# Patient Record
(demographics unavailable — no encounter records)

---

## 2024-10-31 NOTE — ASSESSMENT
[FreeTextEntry1] : Follow up  Dizziness/Hx of vertigo start Meclizine 25mg TID prn - Rx sent to pharmacy. referred to vestibular therapy  Onychomycosis Ciclopirox solution - Rx sent to pharmacy.  Hx of unilateral otalgia Neomycin-Polymyxin ear drops renewed today  Hx of arthritis cont Meloxicam 15mg daily with food  Hx of heart murmur Does not want to follow up with cardiology  HLD cont Simvastatin 40 mg daily -renewed today Reinforced the importance of following a low fat/low cholesterol diet. We'll check Lipid panel and LFTs today.  flu vaccine administered today  Blood work ordered. Follow up in one week for lab results.

## 2024-10-31 NOTE — PHYSICAL EXAM
[No Acute Distress] : no acute distress [Well Nourished] : well nourished [Well Developed] : well developed [Well-Appearing] : well-appearing [Normal Sclera/Conjunctiva] : normal sclera/conjunctiva [Normal Outer Ear/Nose] : the outer ears and nose were normal in appearance [No JVD] : no jugular venous distention [No Lymphadenopathy] : no lymphadenopathy [Supple] : supple [No Respiratory Distress] : no respiratory distress  [No Accessory Muscle Use] : no accessory muscle use [Clear to Auscultation] : lungs were clear to auscultation bilaterally [Normal Rate] : normal rate  [Regular Rhythm] : with a regular rhythm [Normal S1, S2] : normal S1 and S2 [Pedal Pulses Present] : the pedal pulses are present [No Edema] : there was no peripheral edema [No Extremity Clubbing/Cyanosis] : no extremity clubbing/cyanosis [Soft] : abdomen soft [Non Tender] : non-tender [Non-distended] : non-distended [Normal Posterior Cervical Nodes] : no posterior cervical lymphadenopathy [Normal Anterior Cervical Nodes] : no anterior cervical lymphadenopathy [No CVA Tenderness] : no CVA  tenderness [No Spinal Tenderness] : no spinal tenderness [No Rash] : no rash [Coordination Grossly Intact] : coordination grossly intact [No Focal Deficits] : no focal deficits [Normal Gait] : normal gait [Normal Affect] : the affect was normal [Normal Insight/Judgement] : insight and judgment were intact [de-identified] : + II/VI murmur [de-identified] : + trigger finger of thumb of rt hand  [de-identified] : + onychomycosis of left middle toe

## 2024-10-31 NOTE — HISTORY OF PRESENT ILLNESS
[de-identified] : Mr. KELLI BURNS is a 78 year old male with Hx of hypercholesterolemia, heart murmur, arthritis, who presents for a follow up visit and Rx refills.   Pt c/o dizziness. Pt with Hx of vertigo. C/o toe fungus. Denies any SOB, CP, abdominal pain, N/V/D, headache, or leg swelling. Reports compliance with taking his meds daily.

## 2024-10-31 NOTE — ADDENDUM
[FreeTextEntry1] : Documented by Lyndsey Martinez acting as a scribe for Dr. Edilma Boucher. 10/31/2024   All medical record entries made by the scribe were at my, Dr. Edilma Boucher, direction and personally dictated by me on 10/31/2024. I have reviewed the chart and agree that the record accurately reflects my personal performance of the history, physical exam, assessment and plan. I have also personally directed, reviewed, and agreed with the chart.

## 2024-10-31 NOTE — PHYSICAL EXAM
[No Acute Distress] : no acute distress [Well Nourished] : well nourished [Well Developed] : well developed [Well-Appearing] : well-appearing [Normal Sclera/Conjunctiva] : normal sclera/conjunctiva [Normal Outer Ear/Nose] : the outer ears and nose were normal in appearance [No JVD] : no jugular venous distention [No Lymphadenopathy] : no lymphadenopathy [Supple] : supple [No Respiratory Distress] : no respiratory distress  [No Accessory Muscle Use] : no accessory muscle use [Clear to Auscultation] : lungs were clear to auscultation bilaterally [Normal Rate] : normal rate  [Regular Rhythm] : with a regular rhythm [Normal S1, S2] : normal S1 and S2 [Pedal Pulses Present] : the pedal pulses are present [No Edema] : there was no peripheral edema [No Extremity Clubbing/Cyanosis] : no extremity clubbing/cyanosis [Soft] : abdomen soft [Non Tender] : non-tender [Non-distended] : non-distended [Normal Posterior Cervical Nodes] : no posterior cervical lymphadenopathy [Normal Anterior Cervical Nodes] : no anterior cervical lymphadenopathy [No CVA Tenderness] : no CVA  tenderness [No Spinal Tenderness] : no spinal tenderness [No Rash] : no rash [Coordination Grossly Intact] : coordination grossly intact [No Focal Deficits] : no focal deficits [Normal Gait] : normal gait [Normal Affect] : the affect was normal [Normal Insight/Judgement] : insight and judgment were intact [de-identified] : + II/VI murmur [de-identified] : + trigger finger of thumb of rt hand  [de-identified] : + onychomycosis of left middle toe

## 2024-10-31 NOTE — HISTORY OF PRESENT ILLNESS
[de-identified] : Mr. KELLI BURNS is a 78 year old male with Hx of hypercholesterolemia, heart murmur, arthritis, who presents for a follow up visit and Rx refills.   Pt c/o dizziness. Pt with Hx of vertigo. C/o toe fungus. Denies any SOB, CP, abdominal pain, N/V/D, headache, or leg swelling. Reports compliance with taking his meds daily.

## 2025-05-14 NOTE — HISTORY OF PRESENT ILLNESS
[de-identified] : Mr. KELLI BURNS is a 78 year old male with a Hx of hypercholesterolemia, heart murmur, arthritis, who presents for an annual physical.   Pt states he is overall feeling well. Continues to c/o constipation Denies any SOB, CP, abdominal pain, N/V/D, headache, dizziness, or leg swelling. Reports compliance with taking his meds daily.

## 2025-05-14 NOTE — PHYSICAL EXAM
[No Acute Distress] : no acute distress [Well Nourished] : well nourished [Well Developed] : well developed [Well-Appearing] : well-appearing [Normal Sclera/Conjunctiva] : normal sclera/conjunctiva [PERRL] : pupils equal round and reactive to light [EOMI] : extraocular movements intact [Normal Outer Ear/Nose] : the outer ears and nose were normal in appearance [Normal Oropharynx] : the oropharynx was normal [Normal TMs] : both tympanic membranes were normal [No JVD] : no jugular venous distention [No Lymphadenopathy] : no lymphadenopathy [Supple] : supple [Thyroid Normal, No Nodules] : the thyroid was normal and there were no nodules present [No Respiratory Distress] : no respiratory distress  [No Accessory Muscle Use] : no accessory muscle use [Clear to Auscultation] : lungs were clear to auscultation bilaterally [Normal Rate] : normal rate  [Regular Rhythm] : with a regular rhythm [Normal S1, S2] : normal S1 and S2 [Pedal Pulses Present] : the pedal pulses are present [No Edema] : there was no peripheral edema [No Extremity Clubbing/Cyanosis] : no extremity clubbing/cyanosis [Soft] : abdomen soft [Non Tender] : non-tender [Non-distended] : non-distended [No Masses] : no abdominal mass palpated [No HSM] : no HSM [Normal Bowel Sounds] : normal bowel sounds [Normal Posterior Cervical Nodes] : no posterior cervical lymphadenopathy [Normal Anterior Cervical Nodes] : no anterior cervical lymphadenopathy [No CVA Tenderness] : no CVA  tenderness [No Spinal Tenderness] : no spinal tenderness [No Rash] : no rash [Coordination Grossly Intact] : coordination grossly intact [No Focal Deficits] : no focal deficits [Normal Gait] : normal gait [Normal Affect] : the affect was normal [Normal Insight/Judgement] : insight and judgment were intact [de-identified] : +II/VI murmur [de-identified] : +trigger finger of thumb of rt hand

## 2025-05-14 NOTE — ASSESSMENT
[FreeTextEntry1] : Physical  UTD with colonoscopy  Constipation increase daily water and fiber intake start Colace- Rx sent to pharmacy  Hx of heart murmur refuses to see  cardiology  HLD cont Simvastatin 40 mg daily-renewed today Reinforced the importance of following a low fat/low cholesterol diet. We'll check Lipid panel and LFTs today.  Blood work ordered. Follow up in one week for lab results [Vaccines Reviewed] : Immunizations reviewed today. Please see immunization details in the vaccine log within the immunization flowsheet.

## 2025-05-14 NOTE — HEALTH RISK ASSESSMENT
[Good] : ~his/her~  mood as  good [No] : In the past 12 months have you used drugs other than those required for medical reasons? No [No falls in past year] : Patient reported no falls in the past year [Little interest or pleasure doing things] : 1) Little interest or pleasure doing things [Feeling down, depressed, or hopeless] : 2) Feeling down, depressed, or hopeless [0] : 2) Feeling down, depressed, or hopeless: Not at all (0) [PHQ-2 Negative - No further assessment needed] : PHQ-2 Negative - No further assessment needed [[id number]] : 0 [Yes] : Reviewed medication list for presence of high-risk medications. [Former] : Former [20 or more] : 20 or more [> 15 Years] : > 15 Years [de-identified] : Quit 1996 [YES] : Yes [Are there any unlocked firearms stored in your household?] : No unlocked firearms in the household. [Are there any firearms stored in your household that are loaded?] : No firearms are stored in the household loaded. [Are there any children in your household?] : No children are in the household. [Has anyone in the household been feeling low/depressed/been struggling?] : No one in the household has been feeling low/depressed/been struggling. [Have you attended a firearm safety workshop or class?] : A firearm safety workshop or class has been attended. [None] : None [Alone] : lives alone [Retired] : retired [] :  [# Of Children ___] : has [unfilled] children [Fully functional (bathing, dressing, toileting, transferring, walking, feeding)] : Fully functional (bathing, dressing, toileting, transferring, walking, feeding) [Fully functional (using the telephone, shopping, preparing meals, housekeeping, doing laundry, using] : Fully functional and needs no help or supervision to perform IADLs (using the telephone, shopping, preparing meals, housekeeping, doing laundry, using transportation, managing medications and managing finances) [Reports changes in hearing] : Reports no changes in hearing [Reports changes in vision] : Reports no changes in vision [Reports normal functional visual acuity (ie: able to read med bottle)] : Reports normal functional visual acuity [Reports changes in dental health] : Reports no changes in dental health [Smoke Detector] : smoke detector [Carbon Monoxide Detector] : carbon monoxide detector [Seat Belt] :  uses seat belt [Travel to Developing Areas] : does not  travel to developing areas [TB Exposure] : is not being exposed to tuberculosis [ColonoscopyDate] : 7 years ago